# Patient Record
Sex: FEMALE | Race: WHITE
[De-identification: names, ages, dates, MRNs, and addresses within clinical notes are randomized per-mention and may not be internally consistent; named-entity substitution may affect disease eponyms.]

---

## 2018-05-10 ENCOUNTER — HOSPITAL ENCOUNTER (OUTPATIENT)
Dept: HOSPITAL 45 - C.RAD | Age: 23
Discharge: HOME | End: 2018-05-10
Attending: PHYSICIAN ASSISTANT
Payer: COMMERCIAL

## 2018-05-10 DIAGNOSIS — R09.82: Primary | ICD-10-CM

## 2018-05-10 NOTE — DIAGNOSTIC IMAGING REPORT
(BARIUM SWALLOW) ESOPHAGUS



CLINICAL HISTORY: R09.82 Post-nasal drip, hoarseness. Clearing of throat.

Possible reflux.



COMPARISON STUDY:  None



FLUOROSCOPY TIME: 1.1 minutes.



NUMBER OF FLUOROSCOPIC IMAGES:  20



FINDINGS: The patient swallowed effervescent granules and barium without

difficulty. Rapid sequence swallows in the AP and lateral projections were

within functional limits. No esophageal masses are visualized. There are no

ulcerations. There is no reflux. The patient swallowed one half inch barium

tablet which freely passed into the stomach.



IMPRESSION:  Normal study 







Electronically signed by:  Stan Brown M.D.

5/10/2018 8:02 AM



Dictated Date/Time:  5/10/2018 8:01 AM

## 2018-05-10 NOTE — DIAGNOSTIC IMAGING REPORT
Study: Fusion CT of the sinuses



HISTORY: Sinusitis.



FINDINGS: All major sinuses are clear. The ostiomeatal units are patent

bilaterally. No significant mucosal thickening. No significant nasal occlusive

change. Orbital margins are negative for destructive process.



IMPRESSION: Normal study.







Electronically signed by:  Jonas Uribe M.D.

5/10/2018 8:22 AM



Dictated Date/Time:  5/10/2018 8:18 AM

## 2018-08-27 ENCOUNTER — HOSPITAL ENCOUNTER (OUTPATIENT)
Dept: HOSPITAL 45 - C.ULTR | Age: 23
Discharge: HOME | End: 2018-08-27
Attending: OTOLARYNGOLOGY
Payer: COMMERCIAL

## 2018-08-27 DIAGNOSIS — Z87.19: ICD-10-CM

## 2018-08-27 DIAGNOSIS — R68.89: ICD-10-CM

## 2018-08-27 DIAGNOSIS — J30.1: ICD-10-CM

## 2018-08-27 DIAGNOSIS — E04.1: Primary | ICD-10-CM

## 2018-08-27 LAB — HBA1C MFR BLD: 5.7 % (ref 4.5–5.6)

## 2018-08-27 NOTE — DIAGNOSTIC IMAGING REPORT
THYROID ULTRASOUND



HISTORY:      HX OF DYSPHAGIA, SENSATION OF SWOLLEN THROAT



COMPARISON:  None.



FINDINGS:



Right lobe: 5.1 x 1.3 x 1.5 cm. A 2 mm hypoechoic nodule/cyst. No suspicious

nodules identified.



Left lobe: 4.2 x 1.5 x 1.0 cm. No nodules.



Isthmus: 2 mm in thickness. No nodules.



IMPRESSION:  

A 2 mm nodule/cyst within the right thyroid lobe. Otherwise, normal thyroid

gland. 







Electronically signed by:  Stephon Mckeon M.D.

8/27/2018 2:36 PM



Dictated Date/Time:  8/27/2018 2:35 PM